# Patient Record
Sex: FEMALE | Race: BLACK OR AFRICAN AMERICAN | NOT HISPANIC OR LATINO | URBAN - NONMETROPOLITAN AREA
[De-identification: names, ages, dates, MRNs, and addresses within clinical notes are randomized per-mention and may not be internally consistent; named-entity substitution may affect disease eponyms.]

---
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
## 2024-07-22 ENCOUNTER — HOSPITAL ENCOUNTER (EMERGENCY)
Facility: HOSPITAL | Age: 19
Discharge: HOME | End: 2024-07-22
Attending: EMERGENCY MEDICINE
Payer: MEDICAID

## 2024-07-22 ENCOUNTER — APPOINTMENT (OUTPATIENT)
Facility: HOSPITAL | Age: 19
End: 2024-07-22
Attending: EMERGENCY MEDICINE
Payer: MEDICAID

## 2024-07-22 VITALS
DIASTOLIC BLOOD PRESSURE: 81 MMHG | HEIGHT: 68 IN | HEART RATE: 82 BPM | BODY MASS INDEX: 25.76 KG/M2 | SYSTOLIC BLOOD PRESSURE: 125 MMHG | TEMPERATURE: 97.6 F | WEIGHT: 170 LBS | RESPIRATION RATE: 16 BRPM | OXYGEN SATURATION: 99 %

## 2024-07-22 DIAGNOSIS — R07.89 CHEST WALL PAIN: Primary | ICD-10-CM

## 2024-07-22 PROCEDURE — 93005 ELECTROCARDIOGRAM TRACING: CPT | Performed by: EMERGENCY MEDICINE

## 2024-07-22 PROCEDURE — 99284 EMERGENCY DEPT VISIT MOD MDM: CPT

## 2024-07-22 PROCEDURE — 71045 X-RAY EXAM CHEST 1 VIEW: CPT

## 2024-07-22 PROCEDURE — 6370000000 HC RX 637 (ALT 250 FOR IP): Performed by: EMERGENCY MEDICINE

## 2024-07-22 RX ORDER — IBUPROFEN 600 MG/1
600 TABLET ORAL 3 TIMES DAILY PRN
Qty: 30 TABLET | Refills: 0 | Status: SHIPPED | OUTPATIENT
Start: 2024-07-22 | End: 2024-07-28

## 2024-07-22 RX ORDER — IBUPROFEN 600 MG/1
600 TABLET ORAL
Status: COMPLETED | OUTPATIENT
Start: 2024-07-22 | End: 2024-07-22

## 2024-07-22 RX ADMIN — IBUPROFEN 600 MG: 600 TABLET, FILM COATED ORAL at 09:19

## 2024-07-22 NOTE — ED PROVIDER NOTES
[facility] EMERGENCY DEPT  EMERGENCY DEPARTMENT ENCOUNTER      Pt Name: Alyssa Bennett  MRN: [id number]  Birthdate 2005  Date of evaluation: 7/22/2024  Provider: Mik Donaldson MD    CHIEF COMPLAINT       Chief Complaint   Patient presents with    Chest Pain         HISTORY OF PRESENT ILLNESS   (Location/Symptom, Timing/Onset, Context/Setting, Quality, Duration, Modifying Factors, Severity)  Note limiting factors.   Alyssa Bennett is a 18 y.o. female who presents to the emergency department right upper anterior chest pain worse with palpation or cough.  No recent illness fever or sputum production no other family members at home that are ill.  Non-smoker no oral contraceptives no prior history of thromboembolic disease no underlying medical problems denies shortness of breath    HPI    Nursing Notes were reviewed.    REVIEW OF SYSTEMS    (2-9 systems for level 4, 10 or more for level 5)     Review of Systems   All other systems reviewed and are negative.      Except as noted above the remainder of the review of systems was reviewed and negative.       PAST MEDICAL HISTORY   History reviewed. No pertinent past medical history.      SURGICAL HISTORY     History reviewed. No pertinent surgical history.      CURRENT MEDICATIONS       Discharge Medication List as of 7/22/2024  9:16 AM          ALLERGIES     Patient has no known allergies.    FAMILY HISTORY     History reviewed. No pertinent family history.       SOCIAL HISTORY       Social History     Socioeconomic History    Marital status: Single     Spouse name: None    Number of children: None    Years of education: None    Highest education level: None       SCREENINGS         [place] Coma Scale  Eye Opening: Spontaneous  Best Verbal Response: Oriented  Best Motor Response: Obeys commands  [place] Coma Scale Score: 15                     CIWA Assessment  BP: 125/81  Pulse: 82                 PHYSICAL EXAM    (up to 7 for level 4, 8 or more for level 5)     ED Triage

## 2024-07-22 NOTE — ED TRIAGE NOTES
Pt reports intermittent right chest pain with SOB that began yesterday. PT reports pain is worse when she in not moving. PT denies cough and reports hx of childhood asthma. PT in NAD Pt reports she believes her vaping is causing her symptoms.

## 2024-07-23 LAB
EKG ATRIAL RATE: 63 BPM
EKG DIAGNOSIS: NORMAL
EKG P AXIS: 65 DEGREES
EKG P-R INTERVAL: 55 MS
EKG Q-T INTERVAL: 415 MS
EKG QRS DURATION: 79 MS
EKG QTC CALCULATION (BAZETT): 422 MS
EKG R AXIS: 46 DEGREES
EKG T AXIS: 23 DEGREES
EKG VENTRICULAR RATE: 62 BPM

## 2024-07-28 ENCOUNTER — HOSPITAL ENCOUNTER (EMERGENCY)
Facility: HOSPITAL | Age: 19
Discharge: HOME | End: 2024-07-28
Attending: EMERGENCY MEDICINE
Payer: MEDICAID

## 2024-07-28 VITALS
WEIGHT: 168 LBS | OXYGEN SATURATION: 97 % | SYSTOLIC BLOOD PRESSURE: 121 MMHG | RESPIRATION RATE: 19 BRPM | BODY MASS INDEX: 25.46 KG/M2 | TEMPERATURE: 97 F | HEIGHT: 68 IN | HEART RATE: 86 BPM | DIASTOLIC BLOOD PRESSURE: 71 MMHG

## 2024-07-28 DIAGNOSIS — R07.89 CHEST WALL PAIN: Primary | ICD-10-CM

## 2024-07-28 PROCEDURE — 99283 EMERGENCY DEPT VISIT LOW MDM: CPT

## 2024-07-28 RX ORDER — IBUPROFEN 600 MG/1
600 TABLET ORAL 2 TIMES DAILY PRN
Qty: 30 TABLET | Refills: 1 | Status: SHIPPED | OUTPATIENT
Start: 2024-07-28

## 2024-07-28 NOTE — ED PROVIDER NOTES
[facility] EMERGENCY DEPT  EMERGENCY DEPARTMENT ENCOUNTER      Pt Name: Alyssa Bennett  MRN: [id number]  Birthdate 2005  Date of evaluation: 7/28/2024  Provider: Mik Donaldson MD    CHIEF COMPLAINT       Chief Complaint   Patient presents with    Chest Pain         HISTORY OF PRESENT ILLNESS   (Location/Symptom, Timing/Onset, Context/Setting, Quality, Duration, Modifying Factors, Severity)  Note limiting factors.   Alyssa Bennett is a 18 y.o. female who presents to the emergency department complaining of right upper anterior lateral chest pain worse with lifting movement palpation or cough.  Patient was seen in the ED 6 days ago with similar complaints diagnosed with chest wall pain muscle strain treated with ibuprofen which has helped and improved though pain seems to be irritated by repetitive lifting at her job and is an [employer] worker.  No shortness of breath no weakness or numbness to the upper extremities.    Initial evaluation chest x-ray and EKG which were normal the patient has no underlying medical problems    HPI    Nursing Notes were reviewed.    REVIEW OF SYSTEMS    (2-9 systems for level 4, 10 or more for level 5)     Review of Systems   All other systems reviewed and are negative.      Except as noted above the remainder of the review of systems was reviewed and negative.       PAST MEDICAL HISTORY   History reviewed. No pertinent past medical history.      SURGICAL HISTORY     History reviewed. No pertinent surgical history.      CURRENT MEDICATIONS       Discharge Medication List as of 7/28/2024 10:06 AM          ALLERGIES     Patient has no known allergies.    FAMILY HISTORY     History reviewed. No pertinent family history.       SOCIAL HISTORY       Social History     Socioeconomic History    Marital status: Single     Spouse name: None    Number of children: None    Years of education: None    Highest education level: None       SCREENINGS         [place] Coma Scale  Eye Opening:

## 2024-07-28 NOTE — ED TRIAGE NOTES
Pt reports straining with heavy lifting at work and has had pain x5 days. Pt was seen recently for same with clear chest xr and EKG.